# Patient Record
Sex: FEMALE | HISPANIC OR LATINO | URBAN - METROPOLITAN AREA
[De-identification: names, ages, dates, MRNs, and addresses within clinical notes are randomized per-mention and may not be internally consistent; named-entity substitution may affect disease eponyms.]

---

## 2018-11-15 ENCOUNTER — OFFICE VISIT (OUTPATIENT)
Dept: URBAN - METROPOLITAN AREA CLINIC 48 | Facility: CLINIC | Age: 78
End: 2018-11-15
Payer: MEDICARE

## 2018-11-15 DIAGNOSIS — E11.9 TYPE 2 DIABETES MELLITUS WITHOUT COMPLICATIONS: Primary | ICD-10-CM

## 2018-11-15 DIAGNOSIS — H25.813 COMBINED FORMS OF AGE-RELATED CATARACT, BILATERAL: ICD-10-CM

## 2018-11-15 PROCEDURE — 92014 COMPRE OPH EXAM EST PT 1/>: CPT | Performed by: OPTOMETRIST

## 2018-11-15 PROCEDURE — 92004 COMPRE OPH EXAM NEW PT 1/>: CPT | Performed by: OPTOMETRIST

## 2018-11-15 ASSESSMENT — INTRAOCULAR PRESSURE
OS: 16
OD: 18

## 2018-11-15 NOTE — IMPRESSION/PLAN
Impression: Combined forms of age-related cataract, bilateral: H25.813. Visually significant Plan: Discussed options for Sx w/ patient: [id number] Elicia Maldonado to proceed w/ surgery or wait. Pt elects to wait until vision OS is worse.

## 2020-03-12 NOTE — IMPRESSION/PLAN
Impression: Type 2 diabetes mellitus without complications: U12.8. Plan: No retinopathy found on today's examination. Discussed importance of blood sugar, blood pressure and cholesterol control. Letter with today's examination results sent to managing provider. fed by clinician cup/self fed

## 2023-04-07 ENCOUNTER — OFFICE VISIT (OUTPATIENT)
Dept: URBAN - METROPOLITAN AREA CLINIC 48 | Facility: CLINIC | Age: 83
End: 2023-04-07
Payer: COMMERCIAL

## 2023-04-07 DIAGNOSIS — H53.021 REFRACTIVE AMBLYOPIA, RIGHT EYE: ICD-10-CM

## 2023-04-07 DIAGNOSIS — H25.813 COMBINED FORMS OF AGE-RELATED CATARACT, BILATERAL: Primary | ICD-10-CM

## 2023-04-07 PROCEDURE — 99204 OFFICE O/P NEW MOD 45 MIN: CPT | Performed by: STUDENT IN AN ORGANIZED HEALTH CARE EDUCATION/TRAINING PROGRAM

## 2023-04-07 PROCEDURE — 92020 GONIOSCOPY: CPT | Performed by: STUDENT IN AN ORGANIZED HEALTH CARE EDUCATION/TRAINING PROGRAM

## 2023-04-07 ASSESSMENT — VISUAL ACUITY
OD: 20/150
OS: 20/25

## 2023-04-07 ASSESSMENT — INTRAOCULAR PRESSURE
OS: 16
OD: 19

## 2023-04-07 NOTE — IMPRESSION/PLAN
Impression: Combined forms of age-related cataract, bilateral: H25.813. 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 After discussion with the patient and careful examination it has been determined that a cataract in the left eye is accounting for a significant amount of patient's visual symptoms. Cataract surgery and the associated risks, benefits, alternatives, expectations, and recovery were discussed in detail with the patient. All questions were answered. The patient understands that there may be some limitation in visual potential given any pre-existing ocular disease. Advised patient we will be using: One [facility] Cataract Surgery OS. RL 2
7.5 mm dilation, no  previous LASIK surgery, no alpha blockers Discussed lens options with patient, patient candidate for standard lens given OD.

## 2023-04-07 NOTE — IMPRESSION/PLAN
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 with patient.

## 2024-11-15 ENCOUNTER — OFFICE VISIT (OUTPATIENT)
Dept: URBAN - METROPOLITAN AREA CLINIC 48 | Facility: CLINIC | Age: 84
End: 2024-11-15
Payer: MEDICARE

## 2024-11-15 DIAGNOSIS — H53.041: ICD-10-CM

## 2024-11-15 DIAGNOSIS — H25.813 COMBINED FORMS OF AGE-RELATED CATARACT, BILATERAL: Primary | ICD-10-CM

## 2024-11-15 PROCEDURE — 99214 OFFICE O/P EST MOD 30 MIN: CPT | Performed by: STUDENT IN AN ORGANIZED HEALTH CARE EDUCATION/TRAINING PROGRAM

## 2024-11-15 ASSESSMENT — KERATOMETRY
OD: 47.88
OS: 43.38

## 2024-11-15 ASSESSMENT — INTRAOCULAR PRESSURE
OS: 17
OD: 16